# Patient Record
Sex: FEMALE | Race: WHITE | NOT HISPANIC OR LATINO | Employment: OTHER | URBAN - METROPOLITAN AREA
[De-identification: names, ages, dates, MRNs, and addresses within clinical notes are randomized per-mention and may not be internally consistent; named-entity substitution may affect disease eponyms.]

---

## 2023-02-07 ENCOUNTER — HOSPITAL ENCOUNTER (OUTPATIENT)
Dept: BONE DENSITY | Facility: HOSPITAL | Age: 73
Discharge: HOME | End: 2023-02-07
Attending: FAMILY MEDICINE | Admitting: FAMILY MEDICINE
Payer: MEDICARE

## 2023-02-07 DIAGNOSIS — M81.0 OSTEOPOROSIS, UNSPECIFIED OSTEOPOROSIS TYPE, UNSPECIFIED PATHOLOGICAL FRACTURE PRESENCE: ICD-10-CM

## 2023-02-07 PROCEDURE — 77080 DXA BONE DENSITY AXIAL: CPT

## 2023-04-26 ENCOUNTER — TRANSFERRED RECORDS (OUTPATIENT)
Dept: HEALTH INFORMATION MANAGEMENT | Facility: CLINIC | Age: 73
End: 2023-04-26

## 2024-03-20 ENCOUNTER — TRANSFERRED RECORDS (OUTPATIENT)
Dept: HEALTH INFORMATION MANAGEMENT | Facility: CLINIC | Age: 74
End: 2024-03-20
Payer: COMMERCIAL

## 2024-03-22 ENCOUNTER — TRANSFERRED RECORDS (OUTPATIENT)
Dept: HEALTH INFORMATION MANAGEMENT | Facility: CLINIC | Age: 74
End: 2024-03-22
Payer: COMMERCIAL

## 2024-04-04 ENCOUNTER — TRANSFERRED RECORDS (OUTPATIENT)
Dept: HEALTH INFORMATION MANAGEMENT | Facility: CLINIC | Age: 74
End: 2024-04-04

## 2024-04-09 ENCOUNTER — DOCUMENTATION ONLY (OUTPATIENT)
Dept: RADIATION ONCOLOGY | Facility: HOSPITAL | Age: 74
End: 2024-04-09

## 2024-04-09 NOTE — PROGRESS NOTES
Records requested from [facility]([place], [place]) on 4/09/2024. Records requested include last H&P, image reports to be faxed and pushed to [facility], and any pathology related to cancer diagnosis.

## 2024-05-06 ENCOUNTER — TRANSFERRED RECORDS (OUTPATIENT)
Dept: HEALTH INFORMATION MANAGEMENT | Facility: CLINIC | Age: 74
End: 2024-05-06

## 2024-06-13 PROBLEM — R73.01 ELEVATED FASTING GLUCOSE: Status: ACTIVE | Noted: 2023-03-26

## 2024-06-13 PROBLEM — M81.0 OSTEOPOROSIS: Status: ACTIVE | Noted: 2024-06-13

## 2024-06-17 ENCOUNTER — ONCOLOGY VISIT (OUTPATIENT)
Dept: RADIATION ONCOLOGY | Facility: HOSPITAL | Age: 74
End: 2024-06-17
Attending: RADIOLOGY
Payer: COMMERCIAL

## 2024-06-17 VITALS
RESPIRATION RATE: 16 BRPM | TEMPERATURE: 97.2 F | BODY MASS INDEX: 20.43 KG/M2 | HEIGHT: 63 IN | WEIGHT: 115.3 LBS | OXYGEN SATURATION: 98 % | SYSTOLIC BLOOD PRESSURE: 110 MMHG | DIASTOLIC BLOOD PRESSURE: 68 MMHG | HEART RATE: 81 BPM

## 2024-06-17 DIAGNOSIS — C50.312 CARCINOMA OF LOWER-INNER QUADRANT OF LEFT BREAST IN FEMALE, ESTROGEN RECEPTOR POSITIVE (H): Primary | ICD-10-CM

## 2024-06-17 DIAGNOSIS — Z17.0 CARCINOMA OF LOWER-INNER QUADRANT OF LEFT BREAST IN FEMALE, ESTROGEN RECEPTOR POSITIVE (H): Primary | ICD-10-CM

## 2024-06-17 PROCEDURE — G0463 HOSPITAL OUTPT CLINIC VISIT: HCPCS

## 2024-06-17 PROCEDURE — 99204 OFFICE O/P NEW MOD 45 MIN: CPT | Performed by: RADIOLOGY

## 2024-06-17 RX ORDER — POTASSIUM CHLORIDE 750 MG/1
10 CAPSULE, EXTENDED RELEASE ORAL DAILY
COMMUNITY
Start: 2024-05-01 | End: 2025-05-08

## 2024-06-17 RX ORDER — SIMVASTATIN 20 MG
20 TABLET ORAL DAILY
COMMUNITY
Start: 2024-04-30

## 2024-06-17 RX ORDER — LOSARTAN POTASSIUM AND HYDROCHLOROTHIAZIDE 12.5; 5 MG/1; MG/1
1 TABLET ORAL DAILY
COMMUNITY
Start: 2024-04-30

## 2024-06-17 RX ORDER — IMIQUIMOD 12.5 MG/.25G
CREAM TOPICAL SEE ADMIN INSTRUCTIONS
COMMUNITY
Start: 2023-11-21

## 2024-06-17 RX ORDER — ACETAMINOPHEN 160 MG
2000 TABLET,DISINTEGRATING ORAL DAILY
COMMUNITY

## 2024-06-17 ASSESSMENT — PAIN SCALES - GENERAL: PAINLEVEL: NO PAIN (0)

## 2024-06-17 ASSESSMENT — PATIENT HEALTH QUESTIONNAIRE - PHQ9: SUM OF ALL RESPONSES TO PHQ QUESTIONS 1-9: 0

## 2024-06-17 NOTE — PROGRESS NOTES
"Radiation Oncology Rooming Note    June 17, 2024 7:55 AM     Sandra Birch is a 74 year old female who presents for:       Chief Complaint   Patient presents with    Consult     Radiation therapy consult       Initial Vitals: /68 (BP Location: Right arm, Patient Position: Chair, Cuff Size: Adult Regular)   Pulse 81   Temp 97.2  F (36.2  C) (Tympanic)   Resp 16   Ht 1.6 m (5' 3\")   Wt 52.3 kg (115 lb 4.8 oz)   SpO2 98%   BMI 20.42 kg/m     Estimated body mass index is 20.42 kg/m  as calculated from the following:    Height as of this encounter: 1.6 m (5' 3\").    Weight as of this encounter: 52.3 kg (115 lb 4.8 oz).   Body surface area is 1.52 meters squared.  No Pain (0) Comment: Data Unavailable       Allergies reviewed: Yes  Medications reviewed: Yes      Patient completed the following questionnaires: PHQ-9 and NCCN Distress Thermometer.       Patient was assessed using the NCCN psychosocial distress thermometer. Patient rated the score as a 5. Patient rated current stressors as sleep and grief/loss. Stressors brought to the attention of provider for a score of 6 or greater or per nurses discretion.       Nutrition Assessment    Height: 5' 3\" Weight: 115 lbs 4.8 oz    Usual Weight: 115# Weight Change: 0      Past Medical History:   Diagnosis Date    Anemia     Anxiety state     Benign neoplasm of colon 03/03/2015    Closed fracture of bone     fracture of fingers    Elevated fasting glucose 03/26/2023    Gastroesophageal reflux disease with esophagitis     History of SCC (squamous cell carcinoma) of skin 12/21/2011    Formatting of this note might be different from the original.   BCC, left cheek, s/p Mohs 12/2010.   BCC, right upper neck, s/p excision 01/2012.  Formatting of this note might be different from the original.   SCCis, left superior forehead, s/p Mohs 7/14/2022      Hyperlipidemia 11/19/2013    Formatting of this note might be different from the original.   ICD 10      " Hypertension 11/19/2013    Intramural leiomyoma of uterus     Malignant neoplasm of left breast (H) 03/22/2024    Menopausal syndrome (hot flashes) 07/02/2007    Metrorrhagia     Osteoporosis 06/13/2024    Formatting of this note might be different from the original.   On alendronate starting in 2003, stopped 2012 and DEXA scan done, will recheck in one year   DEXA scan done 1/2014 showed a decrease in bone density.  Alendronate restarted      Patellar fracture 07/08/2009    Formatting of this note might be different from the original.   Surgical repair 1/08      Polyp of corpus uteri     Schatzki's ring 08/20/2009    Vitamin D deficiency 07/09/2009       1. Receiving Chemotherapy? No - 0 points  2. Weight Loss per Month (in pounds) Based on Usual Weight: 0    100# 100-120# 120-150# 150-200# greater than 200# Pt. System   greater than 5 5 - 6 6 - 8 7 - 10 greater than 10 1 Point   greater than 7 7- 9 9 - 11 11 - 14 greater than 15 2 Points   greater than 10 10 - 12 12 - 15 15 - 20 greater than 20 3 Points       3. Eating Problems: ( 1 Point for Each Problem)       Loss of Appetite     No - 0 points    Difficulty Swallowing    No - 0 points   Nausea    No - 0 points    Early Satiety    No - 0 points   Vomiting    No - 0 points    Taste/Smell Aversions  No - 0 points    Diarrhea    No - 0 points    Esophageal Reflux   No - 0 points   Mouth Soreness/Difficulty Chewing  No - 0 points          Total: 0    4.  Automatic Referral for the Following Diagnosis or Situations:  NA     Comments: no concerns    Total Score: 0 (Scores greater than or equal to 4 will receive a Dietician Consult)        Olga Angle RN

## 2024-06-17 NOTE — PROGRESS NOTES
[facility]    RADIATION ONCOLOGY CONSULTATION      Sandra Birch  is referred by Provider Not In System for an oncology consultation.    PRIMARY PHYSICIAN: Ignacia Reyes     Cancer Staging   No matching staging information was found for the patient.      IMPRESSION: Ms. La Woods is a 74-year-old white female who on routine mammography was found to have a focal asymmetry in the medial left breast.  This was given a BI-RADS of 0.  A subsequent diagnostic mammogram and ultrasound was done and she was found to have a 6 mm x 5 mm x 4 mm mass in the 8 o'clock position of the left breast approximately 4 cm from the nipple.  An ultrasound directed biopsy of this region demonstrated a grade 1 invasive ductal carcinoma with no LVI, and no DCIS.  The tumor was strongly ER/WI positive and HER2 equivocal.  The HER2 was subsequently negative by FISH.  She then underwent a lumpectomy and sentinel node procedure on 6/13/2024.  She was found to have a 3 mm grade 1 invasive ductal carcinoma with a small amount of grade 2 DCIS, cribriform type.  The margins were negative with the invasive carcinoma having a 6 mm margin and DCIS having a 7 mm margin.  2 sentinel nodes were negative.  She has been started on tamoxifen and has been referred to radiation for evaluation and consideration of treatment.  The patient does admit to significant family history of heart disease although she does not have known heart disease.  We had a long discussion regarding the role of radiation therapy versus observation in this patient.  She has a very small tumor with widely negative margins and the tumor is receptor positive.  She will be taking tamoxifen.  Given the fact that this is a left-sided lesion, I believe that there is more potential cardiac toxicity related to radiation and then there is radiation benefit given the very small nature and widely negative margins.  For that reason I have recommended that she go  with an observation only approach with regards to the radiation.  She will continue her close follow-up with her medical oncologist where she will receive her tamoxifen and she will have routine mammography.  We have told patient to call sooner should the need arise and she would need to contact us if she is not able to take her tamoxifen on a long-term basis.    PLAN: As noted above    No orders of the defined types were placed in this encounter.     ______________________________________________________________________  HISTORY OF PRESENT ILLNESS: The patient is a 74-year-old white female who was recently found to have focal asymmetry on a mammogram.  The asymmetry was found to be in the 8 o'clock position of the left breast and the mammogram was given a BI-RADS of 0.  Subsequent diagnostic mammography and ultrasound showed a small lesion at the 8 o'clock position of the left breast.  An ultrasound directed biopsy demonstrated a grade 1 invasive ductal carcinoma with no LVI, no DCIS and ER/AZ positivity.  The HER2 was originally clavicle however was subsequent found to be negative by FISH.  She was seen by medical oncology as well as surgery.  She proceeded with a segmental mastectomy and sentinel node procedure.  The pathology from this demonstrated a 3 mm grade 1 invasive ductal carcinoma with minimal amounts of grade 2 DCIS.  The margins were negative with a wide 6 mm negative margin on the invasive disease and a 7 mm lesion on the DCIS.  2 sentinel nodes were negative.  I do not see that Oncotype DX testing has been done and this is probably due to the very small amount of disease with strong ER/AZ positivity.  Patient will be started on tamoxifen she has been referred for consideration of radiation.  On questioning today, the patient is without complaints.  She has no problems and the tumor was diagnosed on routine mammography.  She does relate a history of cardiac disease in her family but she has no  coronary artery disease herself.                            Scheduling Conflicts: No  Systemic Therapy: Tamoxifen only  Port: No  Pacemaker: No  Hx of autoimmune disorders: No  Previous Radiation Therapy: No      Depression Screening Follow Up        Follow Up Actions Taken  Crisis resource information provided in After Visit Summary               Past Medical History:   Diagnosis Date    Anemia     Anxiety state     Benign neoplasm of colon 2015    Closed fracture of bone     fracture of fingers    Elevated fasting glucose 2023    Gastroesophageal reflux disease with esophagitis     History of SCC (squamous cell carcinoma) of skin 2011    Formatting of this note might be different from the original.   BCC, left cheek, s/p Mohs 2010.   BCC, right upper neck, s/p excision 2012.  Formatting of this note might be different from the original.   SCCis, left superior forehead, s/p Mohs 2022      Hyperlipidemia 2013    Formatting of this note might be different from the original.   ICD 10      Hypertension 2013    Intramural leiomyoma of uterus     Malignant neoplasm of left breast (H) 2024    Menopausal syndrome (hot flashes) 2007    Metrorrhagia     Osteoporosis 2024    Formatting of this note might be different from the original.   On alendronate starting in , stopped  and DEXA scan done, will recheck in one year   DEXA scan done 2014 showed a decrease in bone density.  Alendronate restarted      Patellar fracture 2009    Formatting of this note might be different from the original.   Surgical repair       Polyp of corpus uteri     Schatzki's ring 2009    Vitamin D deficiency 2009       Past Surgical History:   Procedure Laterality Date     SECTION N/A     ENDOMETRIAL ABLATION N/A 2007    HERNIA REPAIR Bilateral 1986    bilateral inguinal hernias    KNEE SURGERY Right 2005    manipulation of frozen knee  "   KNEE SURGERY Right 2008    shattered knee cap    LUMPECTOMY BREAST Left 05/06/2024       Family History   Problem Relation Age of Onset    Breast Cancer Paternal Aunt 45    Cancer Paternal Aunt 75        bile duct       Social History     Tobacco Use    Smoking status: Never    Smokeless tobacco: Never   Substance Use Topics    Alcohol use: Yes     Alcohol/week: 7.0 standard drinks of alcohol     Types: 7 Glasses of wine per week         CURRENT MEDICATIONS:   Current Outpatient Medications   Medication Sig Dispense Refill    Calcium Citrate (CITRACAL OR) Take 1 tablet by mouth daily      Cholecalciferol (VITAMIN D3) 50 MCG (2000 UT) CAPS Take 2,000 Units by mouth daily      losartan-hydrochlorothiazide (HYZAAR) 50-12.5 MG tablet Take 1 tablet by mouth daily      MAGNESIUM PO Take 1 tablet by mouth daily      potassium chloride ER (MICRO-K) 10 MEQ CR capsule Take 10 mEq by mouth daily      simvastatin (ZOCOR) 20 MG tablet Take 20 mg by mouth daily      VITAMIN K PO Take 1 tablet by mouth daily      imiquimod (ALDARA) 5 % external cream Apply topically See Admin Instructions Once a year for 2 weeks on, 1 week off, 2 weeks on (Patient not taking: Reported on 6/17/2024)       No current facility-administered medications for this visit.         ALLERGIES:  Allergies   Allergen Reactions    Lisinopril Cough           ROS: She did not note a breast mass prior to her mammography.  She has no poor appetite, weight loss, or bone pain with a 0/10 pain score.  Her review of systems is noncontributory.        VITAL SIGNS:  /68 (BP Location: Right arm, Patient Position: Chair, Cuff Size: Adult Regular)   Pulse 81   Temp 97.2  F (36.2  C) (Tympanic)   Resp 16   Ht 1.6 m (5' 3\")   Wt 52.3 kg (115 lb 4.8 oz)   SpO2 98%   BMI 20.42 kg/m    Wt Readings from Last 4 Encounters:   06/17/24 52.3 kg (115 lb 4.8 oz)       PHYSICAL EXAM:  Constitutional: awake, alert, cooperative, no apparent distress, and appears stated " age  Eyes: Lids and lashes normal, pupils equal, round and reactive to light, extra ocular muscles intact, sclera clear, conjunctiva normal  ENT: Normocephalic, without obvious abnormality, atraumatic, sinuses nontender on palpation, external ears without lesions, oral pharynx with moist mucous membranes, tonsils without erythema or exudates, gums normal and good dentition.  Hematologic / Lymphatic: no cervical lymphadenopathy  Respiratory: No increased work of breathing, good air exchange, clear to auscultation bilaterally, no crackles or wheezing  Cardiovascular: Normal apical impulse, regular rate and rhythm, normal S1 and S2, no S3 or S4, and no murmur noted  GI: No scars, normal bowel sounds, soft, non-distended, non-tender, no masses palpated, no hepatosplenomegally  Genitounirinary: Deferred  Skin: no bruising or bleeding  Musculoskeletal: There is no redness, warmth, or swelling of the joints.  Full range of motion noted.  Motor strength is 5 out of 5 all extremities bilaterally.  Tone is normal.  Neurologic: Awake, alert, oriented to name, place and time.  Cranial nerves II-XII are grossly intact.  Motor is 5 out of 5 bilaterally.  Cerebellar finger to nose, heel to shin intact.  Sensory is intact.  Babinski down going, Romberg negative, and gait is normal.  Neuropsychiatric: The patient is alert and cooperative and answers questions appropriately.    I personally reviewed imaging, pathology and laboratory reports as well as physician documentation.. Key management decisions made by me and carried out under my direction include: We discussed observation versus external beam radiation therapy.  We discussed in detail the small but statistically significant improvement in local control with radiation therapy despite her small size lesion.  We also discussed her family history of heart disease and we discussed potential cardiac toxicity while treating a left-sided malignancy.  After a full discussion she has  decided to proceed with observation which I believe is her best option.     Physician spent 25 minutes with the patient and 20 minutes on chart, image, and documentation review, for a total of 45 minutes.     Total billable time: 45 minutes           Vitaly Childers MD